# Patient Record
Sex: FEMALE | Race: WHITE | NOT HISPANIC OR LATINO | Employment: FULL TIME | URBAN - METROPOLITAN AREA
[De-identification: names, ages, dates, MRNs, and addresses within clinical notes are randomized per-mention and may not be internally consistent; named-entity substitution may affect disease eponyms.]

---

## 2017-04-10 ENCOUNTER — OFFICE VISIT (OUTPATIENT)
Dept: URGENT CARE | Facility: CLINIC | Age: 42
End: 2017-04-10
Payer: COMMERCIAL

## 2017-04-10 VITALS
BODY MASS INDEX: 48.82 KG/M2 | DIASTOLIC BLOOD PRESSURE: 74 MMHG | WEIGHT: 293 LBS | HEART RATE: 66 BPM | OXYGEN SATURATION: 96 % | TEMPERATURE: 97 F | SYSTOLIC BLOOD PRESSURE: 110 MMHG | HEIGHT: 65 IN | RESPIRATION RATE: 22 BRPM

## 2017-04-10 DIAGNOSIS — J40 BRONCHITIS: Primary | ICD-10-CM

## 2017-04-10 PROCEDURE — 99203 OFFICE O/P NEW LOW 30 MIN: CPT | Mod: S$GLB,,, | Performed by: PHYSICIAN ASSISTANT

## 2017-04-10 PROCEDURE — 1160F RVW MEDS BY RX/DR IN RCRD: CPT | Mod: S$GLB,,, | Performed by: PHYSICIAN ASSISTANT

## 2017-04-10 PROCEDURE — 99999 PR PBB SHADOW E&M-NEW PATIENT-LVL III: CPT | Mod: PBBFAC,,, | Performed by: PHYSICIAN ASSISTANT

## 2017-04-10 RX ORDER — SIMVASTATIN 40 MG/1
40 TABLET, FILM COATED ORAL NIGHTLY
COMMUNITY

## 2017-04-10 RX ORDER — PROMETHAZINE HYDROCHLORIDE AND DEXTROMETHORPHAN HYDROBROMIDE 6.25; 15 MG/5ML; MG/5ML
5 SYRUP ORAL EVERY 6 HOURS PRN
Qty: 120 ML | Refills: 0 | Status: SHIPPED | OUTPATIENT
Start: 2017-04-10

## 2017-04-10 RX ORDER — LISINOPRIL 40 MG/1
40 TABLET ORAL DAILY
COMMUNITY

## 2017-04-10 RX ORDER — AZITHROMYCIN 250 MG/1
250 TABLET, FILM COATED ORAL DAILY
Qty: 6 TABLET | Refills: 0 | Status: SHIPPED | OUTPATIENT
Start: 2017-04-10

## 2017-04-10 RX ORDER — METHYLPREDNISOLONE 4 MG/1
TABLET ORAL
Qty: 1 PACKAGE | Refills: 0 | Status: SHIPPED | OUTPATIENT
Start: 2017-04-10

## 2017-04-10 NOTE — PATIENT INSTRUCTIONS
Bronchitis, Antibiotic Treatment (Adult)    Bronchitis is an infection of the air passages (bronchial tubes) in your lungs. It often occurs when you have a cold. This illness is contagious during the first few days and is spread through the air by coughing and sneezing, or by direct contact (touching the sick person and then touching your own eyes, nose, or mouth).  Symptoms of bronchitis include cough with mucus (phlegm) and low-grade fever. Bronchitis usually lasts 7 to 14 days. Mild cases can be treated with simple home remedies. More severe infection is treated with an antibiotic.  Home care  Follow these guidelines when caring for yourself at home:  · If your symptoms are severe, rest at home for the first 2 to 3 days. When you go back to your usual activities, don't let yourself get too tired.  · Do not smoke. Also avoid being exposed to secondhand smoke.  · You may use over-the-counter medicines to control fever or pain, unless another medicine was prescribed. (Note: If you have chronic liver or kidney disease or have ever had a stomach ulcer or gastrointestinal bleeding, talk with your healthcare provider before using these medicines. Also talk to your provider if you are taking medicine to prevent blood clots.) Aspirin should never be given to anyone younger than 18 years of age who is ill with a viral infection or fever. It may cause severe liver or brain damage.  · Your appetite may be poor, so a light diet is fine. Avoid dehydration by drinking 6 to 8 glasses of fluids per day (such as water, soft drinks, sports drinks, juices, tea, or soup). Extra fluids will help loosen secretions in the nose and lungs.  · Over-the-counter cough, cold, and sore-throat medicines will not shorten the length of the illness, but they may be helpful to reduce symptoms. (Note: Do not use decongestants if you have high blood pressure.)  · Finish all antibiotic medicine. Do this even if you are feeling better after only a  few days.  Follow-up care  Follow up with your healthcare provider, or as advised. If you had an X-ray or ECG (electrocardiogram), a specialist will review it. You will be notified of any new findings that may affect your care.  Note: If you are age 65 or older, or if you have a chronic lung disease or condition that affects your immune system, or you smoke, talk to your healthcare provider about having pneumococcal vaccinations and a yearly influenza vaccination (flu shot).  When to seek medical advice  Call your healthcare provider right away if any of these occur:  · Fever of 100.4°F (38°C) or higher  · Coughing up increased amounts of colored sputum  · Weakness, drowsiness, headache, facial pain, ear pain, or a stiff neck  Call 911, or get immediate medical care  Contact emergency services right away if any of these occur.  · Coughing up blood  · Worsening weakness, drowsiness, headache, or stiff neck  · Trouble breathing, wheezing, or pain with breathing  Date Last Reviewed: 9/13/2015 © 2000-2016 [employer]. [address] [place] [address]. All rights reserved. This information is not intended as a substitute for professional medical care. Always follow your healthcare professional's instructions.      Complete antibiotics and steroids as prescribed  Rest  Drink plenty of clear fluids--at least 64 ounces of water/juice  Normal saline nasal wash (example Ocean spray) to irrigate sinuses and for congestion/runny nose  Flonase or Nasonex to decrease inflammation  Tylenol or Ibuprofen for fever, headache and body aches  Mucinex to help thin secretions and reduce congestion  Promethazine-D to help with cough at night  Cool mist humidifier/vaporizer  Warm salt water gargles for throat comfort  Chloraseptic spray or lozenges for throat comfort  Warm tea with honey  Practice good handwashing      The cough associated with bronchitis can last for a long time, even as long as 2 weeks. However  please see your PCP or go to ER if symptoms worsen, you develop fever, or begin to have shortness of breath.

## 2017-04-10 NOTE — MR AVS SNAPSHOT
[facility]  [address]  [place] [address]  Phone: [phone]  Fax: [phone]                  Vivien Saldana   4/10/2017 9:10 AM   Office Visit    Description:  Female : 1975   Provider:  Juliana Garcia PA-C   Department:  [facility]           Reason for Visit     Cough     Low-back Pain     Sore Throat           Diagnoses this Visit        Comments    Bronchitis    -  Primary            To Do List           Goals (5 Years of Data)     None      Follow-Up and Disposition     Return if symptoms worsen or fail to improve.       These Medications        Disp Refills Start End    promethazine-dextromethorphan (PROMETHAZINE-DM) 6.25-15 mg/5 mL Syrp 120 mL 0 4/10/2017     Take 5 mLs by mouth every 6 (six) hours as needed. May cause drowsiness - Oral    Pharmacy: [facility] [address] [place] [address] [facility] Ph #: [phone]       azithromycin (ZITHROMAX Z-ED) 250 MG tablet 6 tablet 0 4/10/2017     Take 1 tablet (250 mg total) by mouth once daily. Take pack as directed. Take 2 tablets on day 1 then 1 tablet daily until complete - Oral    Pharmacy: [facility] [address] [place] [address] [address] [address] [facility] Ph #: [phone]       methylPREDNISolone (MEDROL DOSEPACK) 4 mg tablet 1 Package 0 4/10/2017     use as directed    Pharmacy: [facility] [address] [place] [address] [facility] Ph #: [phone]         [facility]     [place] On Call Nurse Care Line -  Assistance  Unless otherwise directed by your provider, please contact [facility], our nurse care line that is available for  assistance.     Registered nurses in the [facility] provide: appointment scheduling, clinical advisement, health education, and other advisory services.  Call: [phone] (toll free)                Medications           Message regarding Medications     Verify the changes and/or additions to your medication regime listed below are the same as discussed with your clinician today.  If any of these changes or additions are incorrect, please notify your healthcare provider.        START taking these NEW medications        Refills    promethazine-dextromethorphan (PROMETHAZINE-DM) 6.25-15 mg/5 mL Syrp 0    Sig: Take 5 mLs by mouth every 6 (six) hours as needed. May cause drowsiness    Class: Normal    Route: Oral    azithromycin (ZITHROMAX Z-ED) 250 MG tablet 0    Sig: Take 1 tablet (250 mg total) by mouth once daily. Take pack as directed. Take 2 tablets on day 1 then 1 tablet daily until complete    Class: Normal    Route: Oral    methylPREDNISolone (MEDROL DOSEPACK) 4 mg tablet 0    Sig: use as directed    Class: Normal      STOP taking these medications     GUAIFENESIN/D-METHORPHAN HB/PE (DECONEX DM ORAL) Take by mouth.           Verify that the below list of medications is an accurate representation of the medications you are currently taking.  If none reported, the list may be blank. If incorrect, please contact your healthcare provider. Carry this list with you in case of emergency.           Current Medications     azithromycin (ZITHROMAX Z-ED) 250 MG tablet Take 1 tablet (250 mg total) by mouth once daily. Take pack as directed. Take 2 tablets on day 1 then 1 tablet daily until complete    lisinopril (PRINIVIL,ZESTRIL) 40 MG tablet Take 40 mg by mouth once daily.    methylPREDNISolone (MEDROL DOSEPACK) 4 mg tablet use as directed    promethazine-dextromethorphan (PROMETHAZINE-DM) 6.25-15 mg/5 mL Syrp Take 5 mLs by mouth every 6 (six) hours as needed. May cause drowsiness    simvastatin (ZOCOR) 40 MG tablet Take 40 mg by mouth every evening.           Clinical Reference Information           Your Vitals Were     BP Pulse Temp Resp    110/74 (BP Location: Right arm, Patient Position: Sitting, BP  "Method: Manual) 66 97.4 °F (36.3 °C) (Tympanic) 22    Height Weight SpO2 BMI    5' 5" (1.651 m) 163.8 kg (361 lb 1.8 oz) 96% 60.09 kg/m2      Blood Pressure          Most Recent Value    BP  110/74      Allergies as of 4/10/2017     Bactrim [Sulfamethoxazole-trimethoprim]    Keflex [Cephalexin]    Pcn [Penicillins]      Immunizations Administered on Date of Encounter - 4/10/2017     None      [facility] Sign-Up     Activating your [facility] account is as easy as 1-2-3!     1) Visit [email or web address], select Sign Up Now, enter this activation code and your date of birth, then select Next.  [email or web address]  Expires: 5/25/2017  8:50 AM      2) Create a username and password to use when you visit [facility] in the future and select a security question in case you lose your password and select Next.    3) Enter your e-mail address and click Sign Up!    Additional Information  If you have questions, please e-mail [email or web address] or call [phone] to talk to our [facility] staff. Remember, [facility] is NOT to be used for urgent needs. For medical emergencies, dial 911.         Instructions      Bronchitis, Antibiotic Treatment (Adult)    Bronchitis is an infection of the air passages (bronchial tubes) in your lungs. It often occurs when you have a cold. This illness is contagious during the first few days and is spread through the air by coughing and sneezing, or by direct contact (touching the sick person and then touching your own eyes, nose, or mouth).  Symptoms of bronchitis include cough with mucus (phlegm) and low-grade fever. Bronchitis usually lasts 7 to 14 days. Mild cases can be treated with simple home remedies. More severe infection is treated with an antibiotic.  Home care  Follow these guidelines when caring for yourself at home:  · If your symptoms are severe, rest at home for the first 2 to 3 days. When you go back to your usual activities, don't let yourself get too tired.  · Do not smoke. Also avoid " being exposed to secondhand smoke.  · You may use over-the-counter medicines to control fever or pain, unless another medicine was prescribed. (Note: If you have chronic liver or kidney disease or have ever had a stomach ulcer or gastrointestinal bleeding, talk with your healthcare provider before using these medicines. Also talk to your provider if you are taking medicine to prevent blood clots.) Aspirin should never be given to anyone younger than 18 years of age who is ill with a viral infection or fever. It may cause severe liver or brain damage.  · Your appetite may be poor, so a light diet is fine. Avoid dehydration by drinking 6 to 8 glasses of fluids per day (such as water, soft drinks, sports drinks, juices, tea, or soup). Extra fluids will help loosen secretions in the nose and lungs.  · Over-the-counter cough, cold, and sore-throat medicines will not shorten the length of the illness, but they may be helpful to reduce symptoms. (Note: Do not use decongestants if you have high blood pressure.)  · Finish all antibiotic medicine. Do this even if you are feeling better after only a few days.  Follow-up care  Follow up with your healthcare provider, or as advised. If you had an X-ray or ECG (electrocardiogram), a specialist will review it. You will be notified of any new findings that may affect your care.  Note: If you are age 65 or older, or if you have a chronic lung disease or condition that affects your immune system, or you smoke, talk to your healthcare provider about having pneumococcal vaccinations and a yearly influenza vaccination (flu shot).  When to seek medical advice  Call your healthcare provider right away if any of these occur:  · Fever of 100.4°F (38°C) or higher  · Coughing up increased amounts of colored sputum  · Weakness, drowsiness, headache, facial pain, ear pain, or a stiff neck  Call 911, or get immediate medical care  Contact emergency services right away if any of these  occur.  · Coughing up blood  · Worsening weakness, drowsiness, headache, or stiff neck  · Trouble breathing, wheezing, or pain with breathing  Date Last Reviewed: 9/13/2015  © 0709-3068 [employer]. [address] [place] [address], [place] [address]. All rights reserved. This information is not intended as a substitute for professional medical care. Always follow your healthcare professional's instructions.      Complete antibiotics and steroids as prescribed  Rest  Drink plenty of clear fluids--at least 64 ounces of water/juice  Normal saline nasal wash (example Ocean spray) to irrigate sinuses and for congestion/runny nose  Flonase or Nasonex to decrease inflammation  Tylenol or Ibuprofen for fever, headache and body aches  Mucinex to help thin secretions and reduce congestion  Promethazine-D to help with cough at night  Cool mist humidifier/vaporizer  Warm salt water gargles for throat comfort  Chloraseptic spray or lozenges for throat comfort  Warm tea with honey  Practice good handwashing      The cough associated with bronchitis can last for a long time, even as long as 2 weeks. However please see your PCP or go to ER if symptoms worsen, you develop fever, or begin to have shortness of breath.            Smoking Cessation     If you would like to quit smoking:   You may be eligible for free services if you are a [place] resident and started smoking cigarettes before September 1, 1988.  Call the Smoking Cessation Trust ([facility]) toll free at (523) 572-2024 or (344) 404-9010.   Call 9-800-QUIT-NOW if you do not meet the above criteria.   Contact us via email: [email or web address]   View our website for more information: [email or web address]        Language Assistance Services     ATTENTION: Language assistance services are available, free of charge. Please call [phone].      ATENCIÓN: Si habla español, tiene a ford disposición servicios gratuitos de asistencia lingüística. Llame al  [phone].     GABRIELLA Ý: N?u b?n nói Ti?ng Vi?t, có các d?ch v? h? tr? ngôn ng? mi?n phí dành cho b?n. G?i s? [phone].         [facility] complies with applicable Federal civil rights laws and does not discriminate on the basis of race, color, national origin, age, disability, or sex.

## 2017-04-10 NOTE — PROGRESS NOTES
"Subjective:       Patient ID: Vivien Saldana is a 41 y.o. female.    Chief Complaint: Cough; Low-back Pain; and Sore Throat    Cough   This is a recurrent problem. The current episode started in the past 7 days (5 days). The problem has been gradually worsening. The cough is productive of sputum (sputum). Associated symptoms include headaches, nasal congestion, postnasal drip, rhinorrhea and a sore throat. Pertinent negatives include no chest pain, chills, ear congestion, ear pain, fever, myalgias, shortness of breath, sweats or wheezing. Nothing aggravates the symptoms. Risk factors for lung disease include smoking/tobacco exposure (heavy smoking history for past 36 years, pack a day). Treatments tried: deconex for cough. The treatment provided no relief. Her past medical history is significant for bronchitis (has had recurrent bronchitis, last episode was 1 month ago). There is no history of asthma.     Review of Systems   Constitutional: Positive for fatigue. Negative for chills, fever and unexpected weight change.   HENT: Positive for congestion, postnasal drip, rhinorrhea and sore throat. Negative for ear discharge, ear pain, sinus pressure and sneezing.    Respiratory: Positive for cough. Negative for chest tightness, shortness of breath and wheezing.    Cardiovascular: Negative for chest pain and leg swelling.   Gastrointestinal: Negative for abdominal pain, nausea and vomiting.   Musculoskeletal: Negative for myalgias.   Neurological: Positive for headaches.       Objective:      /74 (BP Location: Right arm, Patient Position: Sitting, BP Method: Manual)  Pulse 66  Temp 97.4 °F (36.3 °C) (Tympanic)   Resp (!) 22  Ht 5' 5" (1.651 m)  Wt (!) 163.8 kg (361 lb 1.8 oz)  SpO2 96%  BMI 60.09 kg/m2  Physical Exam   Constitutional: She is oriented to person, place, and time. She appears well-developed and well-nourished. No distress.   HENT:   Head: Normocephalic and atraumatic.   Right Ear: Tympanic " membrane, external ear and ear canal normal.   Left Ear: Tympanic membrane, external ear and ear canal normal.   Nose: Nose normal. Right sinus exhibits no maxillary sinus tenderness and no frontal sinus tenderness. Left sinus exhibits no maxillary sinus tenderness and no frontal sinus tenderness.   Mouth/Throat: Oropharynx is clear and moist. No oropharyngeal exudate or posterior oropharyngeal erythema. No tonsillar exudate.   Eyes: Conjunctivae and EOM are normal. Pupils are equal, round, and reactive to light. Right eye exhibits no discharge. Left eye exhibits no discharge.   Neck: Normal range of motion. Neck supple.   Cardiovascular: Normal rate, regular rhythm, normal heart sounds and intact distal pulses.  Exam reveals no gallop and no friction rub.    No murmur heard.  Pulmonary/Chest: Effort normal and breath sounds normal. No stridor. No respiratory distress. She has no wheezes. She has no rales. She exhibits no tenderness.   Lymphadenopathy:     She has no cervical adenopathy.   Neurological: She is alert and oriented to person, place, and time. Coordination normal.   Skin: Skin is warm and dry. No rash noted. She is not diaphoretic. No erythema. No pallor.   Nursing note and vitals reviewed.      Assessment:       1. Bronchitis        Plan:       Bronchitis  -     promethazine-dextromethorphan (PROMETHAZINE-DM) 6.25-15 mg/5 mL Syrp; Take 5 mLs by mouth every 6 (six) hours as needed. May cause drowsiness  Dispense: 120 mL; Refill: 0  -     azithromycin (ZITHROMAX Z-ED) 250 MG tablet; Take 1 tablet (250 mg total) by mouth once daily. Take pack as directed. Take 2 tablets on day 1 then 1 tablet daily until complete  Dispense: 6 tablet; Refill: 0  -     methylPREDNISolone (MEDROL DOSEPACK) 4 mg tablet; use as directed  Dispense: 1 Package; Refill: 0    No wheezing appreciated, moving air well. Concern for recurrent bronchitis with sputum production in heavy smoker, will proceed with antibiotics and steroids  and highly encouraged smoking cessation.    Complete antibiotics and steroids as prescribed  Rest  Drink plenty of clear fluids--at least 64 ounces of water/juice  Normal saline nasal wash (example Ocean spray) to irrigate sinuses and for congestion/runny nose  Flonase or Nasonex to decrease inflammation  Tylenol or Ibuprofen for fever, headache and body aches  Mucinex to help thin secretions and reduce congestion  Promethazine-D to help with cough at night  Cool mist humidifier/vaporizer  Warm salt water gargles for throat comfort  Chloraseptic spray or lozenges for throat comfort  Warm tea with honey  Practice good handwashing      The cough associated with bronchitis can last for a long time, even as long as 2 weeks. However please see your PCP or go to ER if symptoms worsen, you develop fever, or begin to have shortness of breath.         Heather Trant PA-C [facility]